# Patient Record
Sex: FEMALE | Race: WHITE | NOT HISPANIC OR LATINO | Employment: FULL TIME | URBAN - METROPOLITAN AREA
[De-identification: names, ages, dates, MRNs, and addresses within clinical notes are randomized per-mention and may not be internally consistent; named-entity substitution may affect disease eponyms.]

---

## 2021-10-11 ENCOUNTER — TRANSCRIBE ORDERS (OUTPATIENT)
Dept: PSYCHIATRY | Facility: CLINIC | Age: 41
End: 2021-10-11

## 2021-10-11 DIAGNOSIS — F41.1 GENERALIZED ANXIETY DISORDER: Primary | ICD-10-CM

## 2021-11-02 ENCOUNTER — OFFICE VISIT (OUTPATIENT)
Dept: PSYCHIATRY | Facility: CLINIC | Age: 41
End: 2021-11-02

## 2021-11-02 DIAGNOSIS — F41.0 PANIC DISORDER: Primary | ICD-10-CM

## 2021-11-02 DIAGNOSIS — F41.1 GENERALIZED ANXIETY DISORDER: Chronic | ICD-10-CM

## 2021-11-02 PROCEDURE — 90792 PSYCH DIAG EVAL W/MED SRVCS: CPT

## 2021-11-02 RX ORDER — QUETIAPINE 150 MG/1
TABLET, FILM COATED, EXTENDED RELEASE ORAL
COMMUNITY
Start: 2021-11-01 | End: 2021-11-02 | Stop reason: SINTOL

## 2021-11-02 RX ORDER — HYDROXYZINE HYDROCHLORIDE 10 MG/1
10 TABLET, FILM COATED ORAL 3 TIMES DAILY PRN
Qty: 90 TABLET | Refills: 1 | Status: SHIPPED | OUTPATIENT
Start: 2021-11-02

## 2021-11-02 RX ORDER — VENLAFAXINE HYDROCHLORIDE 37.5 MG/1
CAPSULE, EXTENDED RELEASE ORAL
COMMUNITY
Start: 2021-08-06 | End: 2021-11-02 | Stop reason: SINTOL

## 2021-11-02 RX ORDER — NORETHINDRONE ACETATE AND ETHINYL ESTRADIOL AND FERROUS FUMARATE 1MG-20(21)
KIT ORAL
COMMUNITY
Start: 2021-08-11

## 2021-11-02 RX ORDER — LORAZEPAM 0.5 MG/1
0.5 TABLET ORAL 2 TIMES DAILY PRN
Qty: 60 TABLET | Refills: 0 | Status: SHIPPED | OUTPATIENT
Start: 2021-11-02 | End: 2022-01-14

## 2021-11-02 RX ORDER — CLONAZEPAM 0.5 MG/1
TABLET ORAL
COMMUNITY
Start: 2021-08-06 | End: 2021-11-02 | Stop reason: ALTCHOICE

## 2021-11-02 RX ORDER — VALACYCLOVIR HYDROCHLORIDE 1 G/1
TABLET, FILM COATED ORAL
COMMUNITY

## 2021-11-02 RX ORDER — BUSPIRONE HYDROCHLORIDE 10 MG/1
10 TABLET ORAL 2 TIMES DAILY
Qty: 60 TABLET | Refills: 2 | Status: SHIPPED | OUTPATIENT
Start: 2021-11-02 | End: 2022-02-02 | Stop reason: SINTOL

## 2021-11-02 NOTE — PROGRESS NOTES
"Subjective   Shereen Delacruz is a 41 y.o. female who presents today for initial evaluation     Chief Complaint:  Anxiety    History of Present Illness:  -Pt reports anxiety that has affected her ability to do her job, with occasional extreme anxiety that prevents her from functioning in her daily activities. She believes this started due to work stress.  -Pt reports no efficacy and experiencing side effects to the medications her PCP recently started her on for anxiety, but she states her PCP thought she could have depression as well. Her PCP gave Quetiapine, caused excessive sedation, clonazepam, which made her feel \"high,\" and 37.5mg Effexor, which seemed to also cause sedation. She has not continued any of these medications.  -Denies hx bipolar d/o, or ever being told she may have bipolar d/o.  -Her sister, a nurse, told her she is not on the right medications for anxiety, which concerns her.  -Reports trying Viibryd with her PCP, but she didn't continue it past the starter pack because she didn't feel it was working.  -Presentation does not match up with screening tools.  SI/HI: No  AVH: No  SA: No  Psychiatric Hospitalizations: No  Possibly Manic Symptoms on MDQ: 2    The following portions of the patient's history were reviewed and updated as appropriate: allergies, current medications, past family history, past medical history, past social history, past surgical history and problem list.    PAST PSYCHIATRIC HISTORY  Axis I  Affective/Bipoloar Disorder, Anxiety/Panic Disorder  Axis II  None    PAST OUTPATIENT TREATMENT  Diagnosis treated:  Affective Disorder, Anxiety/Panic Disorder  Treatment Type:  Medication Management  Prior Psychiatric Medications:  Quetiapine - excessive sedation with 75mg quetiapine.  Effexor - sedation at 37.5mg.  Clonazepam - felt \"high as a kite\"  Support Groups:  None  Sequelae Of Mental Disorder:  job disruption, emotional distress    Interval History  No Change    Side " "Effects  Quetiapine - excessive sedation with 75mg quetiapine.  Effexor - sedation at 37.5mg.  Clonazepam - felt \"high as a kite\"    Past Medical History:  History reviewed. No pertinent past medical history.    Social History:  Social History     Socioeconomic History   • Marital status: Single   Tobacco Use   • Smoking status: Never Smoker   • Smokeless tobacco: Never Used   Substance and Sexual Activity   • Alcohol use: Yes     Comment: 3-4 drinks weekly   • Drug use: Never   • Sexual activity: Defer     Family History:  History reviewed. No pertinent family history.    Past Surgical History:  History reviewed. No pertinent surgical history.    Problem List:  Patient Active Problem List   Diagnosis   • Obesity   • Generalized anxiety disorder   • Panic disorder     Allergy:   No Known Allergies     Discontinued Medications:  Medications Discontinued During This Encounter   Medication Reason   • clonazePAM (KlonoPIN) 0.5 MG tablet Alternate therapy   • QUEtiapine XR (SEROquel XR) 150 MG 24 hr tablet Side effects   • venlafaxine XR (EFFEXOR-XR) 37.5 MG 24 hr capsule Side effects     Current Medications:   Current Outpatient Medications   Medication Sig Dispense Refill   • busPIRone (BUSPAR) 10 MG tablet Take 1 tablet by mouth 2 (Two) Times a Day. 60 tablet 2   • Cholecalciferol 50 MCG (2000 UT) tablet Take  by mouth.     • hydrOXYzine (ATARAX) 10 MG tablet Take 1 tablet by mouth 3 (Three) Times a Day As Needed for Anxiety. 90 tablet 1   • Junel FE 1/20 1-20 MG-MCG per tablet      • LORazepam (Ativan) 0.5 MG tablet Take 1 tablet by mouth 2 (Two) Times a Day As Needed for Anxiety. 60 tablet 0   • valACYclovir (VALTREX) 1000 MG tablet valacyclovir 1 gram tablet       No current facility-administered medications for this visit.     Psychological ROS: positive for - anxiety and irritability  negative for - anxiety, concentration difficulties, depression, disorientation, hallucinations, hostility or suicidal " ideation    Physical Exam:   There were no vitals taken for this visit.    Mental Status Exam:   Orientation:  To person, Place, Time and Situation  Memory: Recent and remote memory Intact  Mood/Affect: Anxious and Restricted  Suicidal Ideations: None  Homicidal Ideations:  None  Hallucinations: None  Delusions:  None  Obsessions: None  Behavior and Psychomotor Activity: Hyperactive  Speech:  Rapid and Rambling  Thought Process: Circum  Associations: Intact  Thought Content: Normal  Language: name objects and repeat phrases  Concentration and computation: Fair  Attention Span: Fair  Fund of Knowledge: Fair  Reliability: fair  Insight into mental health: Fair  Judgement: Fair  Impulse Control:  Fair  Hygiene: good  Cooperation:  Cooperative  Eye Contact:  Fair  Physical/Medical Issues:  No      PHQ-9 Depression Screening  Little interest or pleasure in doing things? 0   Feeling down, depressed, or hopeless? 0   Trouble falling or staying asleep, or sleeping too much?     Feeling tired or having little energy?     Poor appetite or overeating?     Feeling bad about yourself - or that you are a failure or have let yourself or your family down?     Trouble concentrating on things, such as reading the newspaper or watching television?     Moving or speaking so slowly that other people could have noticed? Or the opposite - being so fidgety or restless that you have been moving around a lot more than usual?     Thoughts that you would be better off dead, or of hurting yourself in some way?     PHQ-9 Total Score 0   If you checked off any problems, how difficult have these problems made it for you to do your work, take care of things at home, or get along with other people?     PHQ-2: 0; unlikely depression.  YO-7: 3; unlikely anxiety d/o. Not congruent with presentation.  MDQ: Negative; unlikely bipolar d/o. (11/2/21)    Never smoker    I advised Shereen of the risks of tobacco use.     Lab Results:   No results found for  any previous visit.     Assessment/Plan   Diagnoses and all orders for this visit:    1. Panic disorder (Primary)  -     busPIRone (BUSPAR) 10 MG tablet; Take 1 tablet by mouth 2 (Two) Times a Day.  Dispense: 60 tablet; Refill: 2  -     LORazepam (Ativan) 0.5 MG tablet; Take 1 tablet by mouth 2 (Two) Times a Day As Needed for Anxiety.  Dispense: 60 tablet; Refill: 0  -     hydrOXYzine (ATARAX) 10 MG tablet; Take 1 tablet by mouth 3 (Three) Times a Day As Needed for Anxiety.  Dispense: 90 tablet; Refill: 1    2. Generalized anxiety disorder  -     busPIRone (BUSPAR) 10 MG tablet; Take 1 tablet by mouth 2 (Two) Times a Day.  Dispense: 60 tablet; Refill: 2  -     LORazepam (Ativan) 0.5 MG tablet; Take 1 tablet by mouth 2 (Two) Times a Day As Needed for Anxiety.  Dispense: 60 tablet; Refill: 0  -     hydrOXYzine (ATARAX) 10 MG tablet; Take 1 tablet by mouth 3 (Three) Times a Day As Needed for Anxiety.  Dispense: 90 tablet; Refill: 1    PHQ-2: 0; unlikely depression.  YO-7: 3; unlikely anxiety d/o. Not congruent with presentation.  MDQ: Negative; unlikely bipolar d/o. (11/2/21)  -Start 10mg buspirone bid for anxiety. Pt told we need 6-8 weeks of continuous use to determine efficacy.  -Start .5mg Lorazepam bid prn for panic.  -Start 10mg hydroxyzine tid prn for anxiety. Low-dose at pt request due to hx of sensitivity to medication.  -Counseling recommended to improve her tx efficacy.  -F/U in 6w, determine efficacy of lorazepam and hydroxyzine, and if buspirone was tolerated. Consider GeneSight testing.    Visit Diagnoses:    ICD-10-CM ICD-9-CM   1. Panic disorder  F41.0 300.01   2. Generalized anxiety disorder  F41.1 300.02     TREATMENT PLAN/GOALS: In the short-term, the patient is to continue supportive psychotherapy efforts and medications as indicated. Treatment and medication options were discussed during today's visit. Long-term the goal will be to control symptoms so they do not negatively interfere with other  aspects of the patient's life. Prognosis is optimistic with implementation of the current treatment plan. Patient ackowledged and verbally consented to the treatment plan and was educated on the importance of compliance with treatment and follow-up appointments.    MEDICATION ISSUES:  INSPECT reviewed 11/2/21, and is as expected. No inspect account found.  Discussed medication options and treatment plan of prescribed medication as well as the risks, benefits, and side effects including potential falls, possible impaired driving, and metabolic adversities among others. Patient counseled on a multimodal approach with healthy nutrition, healthy sleep, regular physical activity, social activity, counseling, and medication taking part in his/her psychiatric management. Patient is agreeable to the plan, and he/she agreed to call the office with any worsening of symptoms or onset of side effects. Patient is agreeable to call 911 or go to the nearest ER should he/she begin having SI/HI or self-harming behavior.     Assisted patient in processing above session content; acknowledged and normalized patient’s thoughts, feelings, and concerns. Reviewed positive coping skills and behavior management in session with positive framing of thoughts. Allowed patient to freely discuss issues without interruption or judgment. Provided safe, confidential environment to facilitate the development of positive therapeutic relationship and encourage open and honest communication.    MEDS ORDERED DURING VISIT:  New Medications Ordered This Visit   Medications   • busPIRone (BUSPAR) 10 MG tablet     Sig: Take 1 tablet by mouth 2 (Two) Times a Day.     Dispense:  60 tablet     Refill:  2   • LORazepam (Ativan) 0.5 MG tablet     Sig: Take 1 tablet by mouth 2 (Two) Times a Day As Needed for Anxiety.     Dispense:  60 tablet     Refill:  0   • hydrOXYzine (ATARAX) 10 MG tablet     Sig: Take 1 tablet by mouth 3 (Three) Times a Day As Needed for  Anxiety.     Dispense:  90 tablet     Refill:  1     Return in about 6 weeks (around 12/14/2021) for Recheck.       This document has been electronically signed by Magdiel Olsen PA-C  November 7, 2021 22:42 EST    EMR Dragon transcription disclaimer:  Some of this encounter note is an electronic transcription translation of spoken language to printed text. The electronic translation of spoken language may permit erroneous, or at times, nonsensical words or phrases to be inadvertently transcribed; Although I have reviewed the note for such errors some may still exist.

## 2021-11-24 DIAGNOSIS — F41.1 GENERALIZED ANXIETY DISORDER: Chronic | ICD-10-CM

## 2021-11-24 DIAGNOSIS — F41.0 PANIC DISORDER: ICD-10-CM

## 2021-11-24 RX ORDER — HYDROXYZINE HYDROCHLORIDE 10 MG/1
TABLET, FILM COATED ORAL
Qty: 90 TABLET | Refills: 1 | OUTPATIENT
Start: 2021-11-24

## 2021-11-24 RX ORDER — BUSPIRONE HYDROCHLORIDE 10 MG/1
TABLET ORAL
Qty: 60 TABLET | Refills: 2 | OUTPATIENT
Start: 2021-11-24

## 2022-01-14 ENCOUNTER — OFFICE VISIT (OUTPATIENT)
Dept: PSYCHIATRY | Facility: CLINIC | Age: 42
End: 2022-01-14

## 2022-01-14 DIAGNOSIS — F41.0 PANIC DISORDER: Primary | Chronic | ICD-10-CM

## 2022-01-14 DIAGNOSIS — F41.1 GENERALIZED ANXIETY DISORDER: Chronic | ICD-10-CM

## 2022-01-14 PROCEDURE — 99214 OFFICE O/P EST MOD 30 MIN: CPT

## 2022-01-14 RX ORDER — LORAZEPAM 1 MG/1
1 TABLET ORAL 2 TIMES DAILY PRN
Qty: 15 TABLET | Refills: 0 | Status: SHIPPED | OUTPATIENT
Start: 2022-01-14

## 2022-01-14 RX ORDER — ESCITALOPRAM OXALATE 10 MG/1
10 TABLET ORAL DAILY
Qty: 30 TABLET | Refills: 2 | Status: SHIPPED | OUTPATIENT
Start: 2022-01-14 | End: 2022-03-18

## 2022-02-08 DIAGNOSIS — F41.0 PANIC DISORDER: Chronic | ICD-10-CM

## 2022-02-08 DIAGNOSIS — F41.1 GENERALIZED ANXIETY DISORDER: Chronic | ICD-10-CM

## 2022-02-09 RX ORDER — ESCITALOPRAM OXALATE 10 MG/1
TABLET ORAL
Qty: 30 TABLET | Refills: 2 | OUTPATIENT
Start: 2022-02-09

## 2022-03-18 ENCOUNTER — OFFICE VISIT (OUTPATIENT)
Dept: PSYCHIATRY | Facility: CLINIC | Age: 42
End: 2022-03-18

## 2022-03-18 DIAGNOSIS — F41.0 PANIC DISORDER: Primary | Chronic | ICD-10-CM

## 2022-03-18 DIAGNOSIS — F41.1 GENERALIZED ANXIETY DISORDER: Chronic | ICD-10-CM

## 2022-03-18 PROCEDURE — 99214 OFFICE O/P EST MOD 30 MIN: CPT

## 2022-03-18 RX ORDER — VILAZODONE HYDROCHLORIDE 20 MG/1
20 TABLET ORAL DAILY
Qty: 30 TABLET | Refills: 2 | Status: SHIPPED | OUTPATIENT
Start: 2022-03-18 | End: 2022-03-23

## 2022-03-18 NOTE — PROGRESS NOTES
"Subjective   Shereen Delacruz is a 41 y.o. female who presents today for f/u med management.    Chief Complaint:  Anxiety    History of Present Illness:  11/2/21: Pt reports anxiety that has affected her ability to do her job, with occasional extreme anxiety that prevents her from functioning in her daily activities. She believes this started due to work stress.  -Pt reports no efficacy and experiencing side effects to the medications her PCP recently started her on for anxiety, but she states her PCP thought she could have depression as well. Her PCP gave Quetiapine, caused excessive sedation, clonazepam, which made her feel \"high,\" and 37.5mg Effexor, which seemed to also cause sedation. She has not continued any of these medications.  -Denies hx bipolar d/o, or ever being told she may have bipolar d/o.  -Her sister, a nurse, told her she is not on the right medications for anxiety, which concerns her.  -Reports trying Viibryd with her PCP, but she didn't continue it past the starter pack because she didn't feel it was working.  -Presentation does not match up with screening tools.  SI/HI: No  AVH: No  SA: No  Psychiatric Hospitalizations: No  Possibly Manic Symptoms on MDQ: 2    1/14/22: Pt says she stopped buspirone because it made her dizzy and nauseous. She has not yet obtained counseling.  -Believes she would benefit from a higher dose of Lorazepam because it doesn't seem to completely stop her panic and control her anxiety, but admits that it has been making her anxiety better since the last visit.  -Believes 10mg hydroxyzine tid prn is somewhat beneficial, and would like to continue it for now.  -Denies AVH or SI/HI    3/18/22: Pt continues to struggle with anxiety, but denies continuing to take Lexapro due to noticed excess sedation after 4 days of use. Says her current work situation is stressful, but she hasn't needed to take any Lorazepam since her job interview because she hasn't had the tightness in her " "chest that indicates panic for her.   -Viibryd starter pack in November of 2021 didn't cause any side effects, but stopped due to getting mono soon after starting.  -c/o of being short tempered, and the slightest things causing her too react poorly.   -Continues to c/o of anxiety that is incongruent with YO-7.    The following portions of the patient's history were reviewed and updated as appropriate: allergies, current medications, past family history, past medical history, past social history, past surgical history and problem list.    PAST PSYCHIATRIC HISTORY  Diagnosis treated:  Affective Disorder, Anxiety/Panic Disorder  Treatment Type:  Medication Management  Prior Psychiatric Medications:  Quetiapine - excessive sedation with 75mg quetiapine.  Effexor - sedation at 37.5mg.  Clonazepam - felt \"high as a kite\"  Buspirone - dizzy and nauseous.  Lexapro - made her sleepy all the time.  Viibryd - cost prohibitive w/o side effects.  Support Groups:  None  Sequelae Of Mental Disorder:  job disruption, emotional distress    Interval History  No change due to lexapro intolerability.    Side Effects  Lexapro - excess sedation.    Past Medical History:  Past Medical History:   Diagnosis Date   • Cancer (HCC) basal cell     Social History:  Social History     Socioeconomic History   • Marital status: Single   Tobacco Use   • Smoking status: Never Smoker   • Smokeless tobacco: Never Used   Substance and Sexual Activity   • Alcohol use: Yes     Alcohol/week: 2.0 standard drinks     Types: 2 Glasses of wine per week     Comment: 3-4 drinks weekly   • Drug use: Never   • Sexual activity: Not Currently     Birth control/protection: Other     Comment: Junel Fe daily pill     Family History:  History reviewed. No pertinent family history.    Past Surgical History:  Past Surgical History:   Procedure Laterality Date   • COLONOSCOPY  done at age 26   • SKIN BIOPSY  basal cell   • TONSILLECTOMY  removed at age 20     Problem " List:  Patient Active Problem List   Diagnosis   • Obesity   • Generalized anxiety disorder   • Panic disorder     Allergy:   No Known Allergies     Discontinued Medications:  Medications Discontinued During This Encounter   Medication Reason   • escitalopram (Lexapro) 10 MG tablet Drug interaction     Current Medications:   Current Outpatient Medications   Medication Sig Dispense Refill   • Cholecalciferol 50 MCG (2000 UT) tablet Take  by mouth.     • hydrOXYzine (ATARAX) 10 MG tablet Take 1 tablet by mouth 3 (Three) Times a Day As Needed for Anxiety. 90 tablet 1   • Junel FE 1/20 1-20 MG-MCG per tablet      • LORazepam (Ativan) 1 MG tablet Take 1 tablet by mouth 2 (Two) Times a Day As Needed for Anxiety. 15 tablet 0   • valACYclovir (VALTREX) 1000 MG tablet valacyclovir 1 gram tablet     • Vortioxetine HBr (Trintellix) 5 MG tablet Take 5 mg by mouth Daily With Breakfast. 30 tablet 2     No current facility-administered medications for this visit.     Psychological ROS: positive for - anxiety and irritability, fatigue, change in eating.  negative for - anxiety, concentration difficulties, depression, disorientation, hallucinations, hostility or SI.    Physical Exam:   There were no vitals taken for this visit.    Mental Status Exam:   Orientation:  To person, Place, Time and Situation  Memory: Recent and remote memory Intact  Mood/Affect: Anxious and Restricted, depressed.  Suicidal Ideations: None  Homicidal Ideations:  None  Hallucinations: None  Delusions:  None  Obsessions: None  Behavior and Psychomotor Activity: Normal  Speech:  Rapid and Rambling  Thought Process: Circum  Associations: Intact  Thought Content: Normal  Language: name objects and repeat phrases  Concentration and computation: Fair  Attention Span: Fair  Fund of Knowledge: Fair  Reliability: fair  Insight into mental health: Fair  Judgement: Fair  Impulse Control:  Fair  Hygiene: good  Cooperation:  Cooperative  Eye Contact:   Fair  Physical/Medical Issues:  No      MSE reviewed and accepted w/o any needed changes from the previous visit.    PHQ-9 Depression Screening: Table won't populate.  Little interest or pleasure in doing things?  0   Feeling down, depressed, or hopeless?  0   Trouble falling or staying asleep, or sleeping too much?     Feeling tired or having little energy?     Poor appetite or overeating?     Feeling bad about yourself - or that you are a failure or have let yourself or your family down?     Trouble concentrating on things, such as reading the newspaper or watching television?     Moving or speaking so slowly that other people could have noticed? Or the opposite - being so fidgety or restless that you have been moving around a lot more than usual?     Thoughts that you would be better off dead, or of hurting yourself in some way?     PHQ-9 Total Score  2   If you checked off any problems, how difficult have these problems made it for you to do your work, take care of things at home, or get along with other people?     PHQ-2: 0; unlikely depression. Previously: 0  YO-7: 5; mild anxiety. Previously: 0, 3  *YO-7 and PHQ-9 are never congruent with presentation.  MDQ: Negative; unlikely bipolar d/o. (11/2/21)    Never smoker    I advised Shereen of the risks of tobacco use.     Lab Results:   No visits with results within 3 Month(s) from this visit.   Latest known visit with results is:   No results found for any previous visit.     Assessment/Plan   Diagnoses and all orders for this visit:    1. Panic disorder (Primary)  -     Discontinue: vilazodone (Viibryd) 20 MG tablet tablet; Take 1 tablet by mouth Daily.  Dispense: 30 tablet; Refill: 2    2. Generalized anxiety disorder  -     Discontinue: vilazodone (Viibryd) 20 MG tablet tablet; Take 1 tablet by mouth Daily.  Dispense: 30 tablet; Refill: 2    PHQ-2: 0; unlikely depression. Previously: 0  YO-7: 5; mild anxiety. Previously: 0, 3  *YO-7 and PHQ-9 are never  congruent with presentation.  MDQ: Negative; unlikely bipolar d/o. (11/2/21)  -d/c lexapro due to excessive sedation on tx after 4 days.  -Start Viibryd due to previous tolerability on sample pack. Pt given sample pack for restart, and 20mg Rx sent to continue once the pt has finished the samples and a PA has had time to process.  -Try Trintellix at 5mg if Viibryd is not approved and cost prohibitive.  -F/U in 2m, determine benefit of Viibryd.  -Cont. 1mg Lorazepam bid prn for panic.  -Cont. 10mg hydroxyzine tid prn for anxiety.  -Counseling continues to be encouraged.  -F/U in 2m, determine efficacy and tolerability of 10mg Viibryd, along with the benefit of 1mg lorazepam and 10mg hydroxyzine and the amount they are being used. Consider GeneSight when appropriate, and see if counseling acquired.    -Addendum 3/23/22: due to cost pt was switched from Viibryd to 5mg of Trintellix and told she can come in and get samples until a likely PA is approved.    Visit Diagnoses:    ICD-10-CM ICD-9-CM   1. Panic disorder  F41.0 300.01   2. Generalized anxiety disorder  F41.1 300.02     TREATMENT PLAN/GOALS: In the short-term, the patient is to continue supportive psychotherapy efforts and medications as indicated. Treatment and medication options were discussed during today's visit. Long-term the goal will be to control symptoms so they do not negatively interfere with other aspects of the patient's life. Prognosis is optimistic with implementation of the current treatment plan. Patient ackowledged and verbally consented to the treatment plan and was educated on the importance of compliance with treatment and follow-up appointments.    MEDICATION ISSUES:  INSPECT reviewed 3/18/22, and is as expected. 1mg Lorazepam filled 1/14/22 (Q:15).  Discussed medication options and treatment plan of prescribed medication as well as the risks, benefits, and side effects including potential falls, possible impaired driving, and metabolic  adversities among others. Patient counseled on a multimodal approach with healthy nutrition, healthy sleep, regular physical activity, social activity, counseling, and medication taking part in his/her psychiatric management. Patient is agreeable to the plan, and he/she agreed to call the office with any worsening of symptoms or onset of side effects. Patient is agreeable to call 911 or go to the nearest ER should he/she begin having SI/HI or self-harming behavior.     Assisted patient in processing above session content; acknowledged and normalized patient’s thoughts, feelings, and concerns. Reviewed positive coping skills and behavior management in session with positive framing of thoughts. Allowed patient to freely discuss issues without interruption or judgment. Provided safe, confidential environment to facilitate the development of positive therapeutic relationship and encourage open and honest communication.    MEDS ORDERED DURING VISIT:  No orders of the defined types were placed in this encounter.    Return in about 2 months (around 5/18/2022) for Recheck.     This document has been electronically signed by Magdiel Olsen PA-C  March 23, 2022 17:33 EDT    EMR Dragon transcription disclaimer:  Part of this note may be an electronic transcription/translation of spoken language to printed text using the Dragon Dictation System.

## 2022-03-23 ENCOUNTER — TELEPHONE (OUTPATIENT)
Dept: PSYCHIATRY | Facility: CLINIC | Age: 42
End: 2022-03-23

## 2022-03-23 DIAGNOSIS — F41.0 PANIC DISORDER: Chronic | ICD-10-CM

## 2022-03-23 DIAGNOSIS — F41.1 GENERALIZED ANXIETY DISORDER: Primary | Chronic | ICD-10-CM

## 2022-03-23 RX ORDER — VORTIOXETINE 5 MG/1
5 TABLET, FILM COATED ORAL
Qty: 30 TABLET | Refills: 2 | Status: SHIPPED | OUTPATIENT
Start: 2022-03-23 | End: 2022-05-19 | Stop reason: DRUGHIGH

## 2022-05-19 ENCOUNTER — OFFICE VISIT (OUTPATIENT)
Dept: PSYCHIATRY | Facility: CLINIC | Age: 42
End: 2022-05-19

## 2022-05-19 DIAGNOSIS — F41.1 GENERALIZED ANXIETY DISORDER: Primary | Chronic | ICD-10-CM

## 2022-05-19 DIAGNOSIS — F41.0 PANIC DISORDER: Chronic | ICD-10-CM

## 2022-05-19 PROCEDURE — 99214 OFFICE O/P EST MOD 30 MIN: CPT

## 2022-05-19 RX ORDER — VORTIOXETINE 10 MG/1
10 TABLET, FILM COATED ORAL DAILY
Qty: 30 TABLET | Refills: 2 | Status: SHIPPED | OUTPATIENT
Start: 2022-05-19 | End: 2022-07-19 | Stop reason: SDUPTHER

## 2022-07-19 ENCOUNTER — OFFICE VISIT (OUTPATIENT)
Dept: PSYCHIATRY | Facility: CLINIC | Age: 42
End: 2022-07-19

## 2022-07-19 VITALS
HEART RATE: 78 BPM | OXYGEN SATURATION: 99 % | DIASTOLIC BLOOD PRESSURE: 78 MMHG | WEIGHT: 169 LBS | SYSTOLIC BLOOD PRESSURE: 112 MMHG

## 2022-07-19 DIAGNOSIS — F41.1 GENERALIZED ANXIETY DISORDER: Chronic | ICD-10-CM

## 2022-07-19 DIAGNOSIS — F41.0 PANIC DISORDER: Primary | Chronic | ICD-10-CM

## 2022-07-19 PROCEDURE — 99214 OFFICE O/P EST MOD 30 MIN: CPT

## 2022-07-19 RX ORDER — VORTIOXETINE 10 MG/1
10 TABLET, FILM COATED ORAL DAILY
Qty: 90 TABLET | Refills: 1 | Status: SHIPPED | OUTPATIENT
Start: 2022-07-19

## 2022-07-19 NOTE — PROGRESS NOTES
"Subjective   Shereen Delacruz is a 41 y.o. female who presents today for f/u med management.    Chief Complaint: No current complaints.    History of Present Illness:  11/2/21: Pt reports anxiety that has affected her ability to do her job, with occasional extreme anxiety that prevents her from functioning in her daily activities. She believes this started due to work stress.  -Pt reports no efficacy and experiencing side effects to the medications her PCP recently started her on for anxiety, but she states her PCP thought she could have depression as well. Her PCP gave Quetiapine, caused excessive sedation, clonazepam, which made her feel \"high,\" and 37.5mg Effexor, which seemed to also cause sedation. She has not continued any of these medications.  -Denies hx bipolar d/o, or ever being told she may have bipolar d/o.  -Her sister, a nurse, told her she is not on the right medications for anxiety, which concerns her.  -Reports trying Viibryd with her PCP, but she didn't continue it past the starter pack because she didn't feel it was working.  -Presentation does not match up with screening tools.  SI/HI: No  AVH: No  SA: No  Psychiatric Hospitalizations: No  Possibly Manic Symptoms on MDQ: 2    1/14/22: Pt says she stopped buspirone because it made her dizzy and nauseous. She has not yet obtained counseling.  -Believes she would benefit from a higher dose of Lorazepam because it doesn't seem to completely stop her panic and control her anxiety, but admits that it has been making her anxiety better since the last visit.  -Believes 10mg hydroxyzine tid prn is somewhat beneficial, and would like to continue it for now.  -Denies AVH or SI/HI    3/18/22: Pt continues to struggle with anxiety, but denies continuing to take Lexapro due to noticed excess sedation after 4 days of use. Says her current work situation is stressful, but she hasn't needed to take any Lorazepam since her job interview because she hasn't had the " "tightness in her chest that indicates panic for her.   -Viibryd starter pack in November of 2021 didn't cause any side effects, but stopped due to getting mono soon after starting.  -c/o of being short tempered, and the slightest things causing her too react poorly.   -Continues to c/o of anxiety that is incongruent with YO-7.    5/19/22: Pt reports improvement with 5mg Trintellix with continued need for further improvement. Says her reactions have been better to stressful situations, and she denies side effects when taken with food. Denies AVH or SI/HI.    7/19/22: Pt says she feels great on 10mg Trintellix, and goes into work more calm and able to handle stressful situations better. She also has a helper at work now that has improved her anxiety. She is planning to move to [place] to live with her sister, and since she hasn't been able to acquire a job at home with her current employer, she is going to loose insurance and will need a 90 days supply when she leaves in October to last while she is looking to acquire insurance. She denies any side effects from Trintellix like past antidepressants she has taken, along with current anx/dep concerns, SI/HI, or AVH.    The following portions of the patient's history were reviewed and updated as appropriate: allergies, current medications, past family history, past medical history, past social history, past surgical history and problem list.    PAST PSYCHIATRIC HISTORY  Diagnosis treated:  Affective Disorder, Anxiety/Panic Disorder  Treatment Type:  Medication Management  Prior Psychiatric Medications:  Quetiapine - excessive sedation with 75mg quetiapine.  Effexor - sedation at 37.5mg.  Clonazepam - felt \"high as a kite\"  Buspirone - dizzy and nauseous.  Lexapro - made her sleepy all the time.  Viibryd - cost prohibitive w/o side effects.  Support Groups:  None  Sequelae Of Mental Disorder:  job disruption, emotional distress    Interval History  Improved on 10mg " Trintellix.    Side Effects  Denied    Problem List:  Patient Active Problem List   Diagnosis   • Obesity   • Generalized anxiety disorder   • Panic disorder     Allergy:   No Known Allergies     Discontinued Medications:  Medications Discontinued During This Encounter   Medication Reason   • Vortioxetine HBr (Trintellix) 10 MG tablet Reorder      Physical Exam:   Blood pressure 112/78, pulse 78, weight 76.7 kg (169 lb), SpO2 99 %.    Mental Status Exam:   Orientation:  To person, Place, Time and Situation  Memory: Recent and remote memory Intact  Mood/Affect: Euthymic  Suicidal Ideations: None  Homicidal Ideations:  None  Hallucinations: None  Delusions:  None  Obsessions: None  Behavior and Psychomotor Activity: Normal  Speech:  Normal  Thought Process: Goal directed and linear.  Associations: Intact  Thought Content: Normal  Language: name objects and repeat phrases  Concentration and computation: Good  Attention Span: Good  Fund of Knowledge: Fair  Reliability: fair  Insight into mental health: Fair  Judgement: Fair  Impulse Control:  Fair  Hygiene: good  Cooperation:  Cooperative  Eye Contact:  Good  Physical/Medical Issues:  No      MSE reviewed and accepted with changes from the previous visit.    PHQ-9 Depression Screening  Little interest or pleasure in doing things? 0-->not at all   Feeling down, depressed, or hopeless? 0-->not at all   Trouble falling or staying asleep, or sleeping too much? 0-->not at all   Feeling tired or having little energy? 3-->nearly every day   Poor appetite or overeating? 0-->not at all   Feeling bad about yourself - or that you are a failure or have let yourself or your family down? 0-->not at all   Trouble concentrating on things, such as reading the newspaper or watching television? 0-->not at all   Moving or speaking so slowly that other people could have noticed? Or the opposite - being so fidgety or restless that you have been moving around a lot more than usual? 0-->not at  all   Thoughts that you would be better off dead, or of hurting yourself in some way? 0-->not at all   PHQ-9 Total Score 3   If you checked off any problems, how difficult have these problems made it for you to do your work, take care of things at home, or get along with other people? somewhat difficult   Feeling nervous, anxious or on edge: Not at all  Not being able to stop or control worrying: Not at all  Worrying too much about different things: Not at all  Trouble Relaxing: Not at all  Being so restless that it is hard to sit still: Not at all  Feeling afraid as if something awful might happen: Not at all  Becoming easily annoyed or irritable: Not at all  YO 7 Total Score: 0  If you checked any problems, how difficult have these problems made it for you to do your work, take care of things at home, or get along with other people: Not difficult at all   PHQ-2: 0; unlikely depression. Previously: 0, 0.  YO-7: 0; unlikely anxiety. Previously: 1, 5, 0, 3  MDQ: Negative; unlikely bipolar d/o. (11/2/21)    Never smoker    I advised Shereen of the risks of tobacco use.     Lab Results:   No visits with results within 3 Month(s) from this visit.   Latest known visit with results is:   No results found for any previous visit.     Assessment & Plan   Diagnoses and all orders for this visit:    1. Panic disorder (Primary)  -     Vortioxetine HBr (Trintellix) 10 MG tablet; Take 10 mg by mouth Daily.  Dispense: 90 tablet; Refill: 1    2. Generalized anxiety disorder  -     Vortioxetine HBr (Trintellix) 10 MG tablet; Take 10 mg by mouth Daily.  Dispense: 90 tablet; Refill: 1    PHQ-2: 0; unlikely depression. Previously: 0, 0.  YO-7: 0; unlikely anxiety. Previously: 1, 5, 0, 3  MDQ: Negative; unlikely bipolar d/o. (11/2/21)  -Cont. 10mg Trintellix daily for anx/dep with known benefit. 90 day supply sent with refill as requested.  -Cont. 1mg Lorazepam bid prn for panic, but pt has not needed it.  -Cont. 10mg hydroxyzine tid  prn for anxiety, but pt has not needed it.  -Counseling continues to be encouraged.  -F/U PRN due to pt moving, but anxiety is controlled with 10mg Trintellix.    Visit Diagnoses:    ICD-10-CM ICD-9-CM   1. Panic disorder  F41.0 300.01   2. Generalized anxiety disorder  F41.1 300.02     TREATMENT PLAN/GOALS: In the short-term, the patient is to continue supportive psychotherapy efforts and medications as indicated. Treatment and medication options were discussed during today's visit. Long-term the goal will be to control symptoms so they do not negatively interfere with other aspects of the patient's life. Prognosis is optimistic with implementation of the current treatment plan. Patient ackowledged and verbally consented to the treatment plan and was educated on the importance of compliance with treatment and follow-up appointments.    MEDICATION ISSUES:  INSPECT reviewed 7/19/22, and is as expected. Last filled 1mg Lorazepam.  Discussed medication options and treatment plan of prescribed medication as well as the risks, benefits, and side effects including potential falls, possible impaired driving, and metabolic adversities among others. Patient counseled on a multimodal approach with healthy nutrition, healthy sleep, regular physical activity, social activity, counseling, and medication taking part in his/her psychiatric management. Patient is agreeable to the plan, and he/she agreed to call the office with any worsening of symptoms or onset of side effects. Patient is agreeable to call 911 or go to the nearest ER should he/she begin having SI/HI or self-harming behavior.     Assisted patient in processing above session content; acknowledged and normalized patient’s thoughts, feelings, and concerns. Reviewed positive coping skills and behavior management in session with positive framing of thoughts. Allowed patient to freely discuss issues without interruption or judgment. Provided safe, confidential environment  to facilitate the development of positive therapeutic relationship and encourage open and honest communication.    MEDS ORDERED DURING VISIT:  New Medications Ordered This Visit   Medications   • Vortioxetine HBr (Trintellix) 10 MG tablet     Sig: Take 10 mg by mouth Daily.     Dispense:  90 tablet     Refill:  1     Return if symptoms worsen or fail to improve.     This document has been electronically signed by Magdiel Olsen PA-C  July 19, 2022 23:21 EDT    EMR Dragon transcription disclaimer:  Part of this note may be an electronic transcription/translation of spoken language to printed text using the Dragon Dictation System.